# Patient Record
Sex: MALE | Race: ASIAN | NOT HISPANIC OR LATINO | URBAN - METROPOLITAN AREA
[De-identification: names, ages, dates, MRNs, and addresses within clinical notes are randomized per-mention and may not be internally consistent; named-entity substitution may affect disease eponyms.]

---

## 2021-08-14 ENCOUNTER — EMERGENCY (EMERGENCY)
Facility: HOSPITAL | Age: 31
LOS: 1 days | Discharge: HOME | End: 2021-08-14
Admitting: EMERGENCY MEDICINE
Payer: OTHER MISCELLANEOUS

## 2021-08-14 VITALS
SYSTOLIC BLOOD PRESSURE: 122 MMHG | OXYGEN SATURATION: 99 % | TEMPERATURE: 98 F | HEART RATE: 81 BPM | WEIGHT: 190.04 LBS | HEIGHT: 66 IN | RESPIRATION RATE: 16 BRPM | DIASTOLIC BLOOD PRESSURE: 71 MMHG

## 2021-08-14 VITALS
TEMPERATURE: 98 F | DIASTOLIC BLOOD PRESSURE: 75 MMHG | OXYGEN SATURATION: 99 % | HEART RATE: 51 BPM | SYSTOLIC BLOOD PRESSURE: 132 MMHG | RESPIRATION RATE: 18 BRPM
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
## 2021-08-14 DIAGNOSIS — S61.411A LACERATION WITHOUT FOREIGN BODY OF RIGHT HAND, INITIAL ENCOUNTER: ICD-10-CM

## 2021-08-14 DIAGNOSIS — S61.421A LACERATION WITH FOREIGN BODY OF RIGHT HAND, INITIAL ENCOUNTER: ICD-10-CM
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
## 2021-08-14 DIAGNOSIS — Z23 ENCOUNTER FOR IMMUNIZATION: ICD-10-CM

## 2021-08-14 PROCEDURE — 73130 X-RAY EXAM OF HAND: CPT | Mod: 26,RT

## 2021-08-14 PROCEDURE — 99283 EMERGENCY DEPT VISIT LOW MDM: CPT | Mod: 25

## 2021-08-14 PROCEDURE — 12001 RPR S/N/AX/GEN/TRNK 2.5CM/<: CPT

## 2021-08-14 RX ORDER — CEPHALEXIN 500 MG
1 CAPSULE ORAL
Qty: 14 | Refills: 0
Start: 2021-08-14 | End: 2021-08-20

## 2021-08-14 RX ORDER — CEPHALEXIN 500 MG
500 CAPSULE ORAL ONCE
Refills: 0 | Status: COMPLETED | OUTPATIENT
Start: 2021-08-14 | End: 2021-08-14

## 2021-08-14 RX ORDER — TETANUS TOXOID, REDUCED DIPHTHERIA TOXOID AND ACELLULAR PERTUSSIS VACCINE, ADSORBED 5; 2.5; 8; 8; 2.5 [IU]/.5ML; [IU]/.5ML; UG/.5ML; UG/.5ML; UG/.5ML
0.5 SUSPENSION INTRAMUSCULAR ONCE
Refills: 0 | Status: COMPLETED | OUTPATIENT
Start: 2021-08-14 | End: 2021-08-14

## 2021-08-14 RX ADMIN — TETANUS TOXOID, REDUCED DIPHTHERIA TOXOID AND ACELLULAR PERTUSSIS VACCINE, ADSORBED 0.5 MILLILITER(S): 5; 2.5; 8; 8; 2.5 SUSPENSION INTRAMUSCULAR at 18:50

## 2021-08-14 RX ADMIN — Medication 500 MILLIGRAM(S): at 19:52

## 2021-08-14 NOTE — ED PROVIDER NOTE - CARE PROVIDER_API CALL
urgent care/ED,   one week for suture removal  Phone: (   )    -  Fax: (   )    -  Follow Up Time:

## 2021-08-14 NOTE — ED PROVIDER NOTE - PROVIDER TOKENS
FREE:[LAST:[urgent care/ED],PHONE:[(   )    -],FAX:[(   )    -],ADDRESS:[one week for suture removal]]

## 2021-08-14 NOTE — ED PROVIDER NOTE - PROGRESS NOTE DETAILS
patient with 2x4mm fb on xray, unable to directly visualize on exam. wound irrigated copiously, laceration repaired, bacitracin applied, given keflex in ED. rx sent to pharmacy

## 2021-08-14 NOTE — ED ADULT NURSE NOTE - OBJECTIVE STATEMENT
Pt is a 31y male complaining of laceration to right palm that occurred while cleaning glasses. Unknown last tetanus.

## 2021-08-14 NOTE — ED PROVIDER NOTE - CLINICAL SUMMARY MEDICAL DECISION MAKING FREE TEXT BOX
Patient R hand dominant with laceration to the R palm that occurred while at work cleaning a wine glass. will do xray to assess for foreign body, up date tetanus, and do wound repair

## 2021-08-14 NOTE — ED PROVIDER NOTE - OBJECTIVE STATEMENT
Patient presents with R hand laceration that occurred at 3p while at work. states that he was cleaning a glass when it broke. denies focal weakness or parethesias. does not recall last tetanus Patient presents with R hand laceration that occurred at 3p while at work. states that he was cleaning a glass when it broke. denies focal weakness or parethesias. does not recall last tetanus. R hand dominant

## 2021-08-14 NOTE — ED PROVIDER NOTE - PATIENT PORTAL LINK FT
You can access the FollowMyHealth Patient Portal offered by [facility] by registering at the following website: [email or web address]. By joining [employer]’s FollowMyHealth portal, you will also be able to view your health information using other applications (apps) compatible with our system.

## 2021-08-14 NOTE — ED PROVIDER NOTE - NSFOLLOWUPINSTRUCTIONS_ED_ALL_ED_FT
apply bacitracin to the wound twice daily  return to ED in one week for suture removal.   return immediately if fever, chills, worsening pain, discharge from wound site

## 2021-08-21 ENCOUNTER — EMERGENCY (EMERGENCY)
Facility: HOSPITAL | Age: 31
LOS: 1 days | Discharge: HOME | End: 2021-08-21
Attending: EMERGENCY MEDICINE | Admitting: EMERGENCY MEDICINE
Payer: OTHER MISCELLANEOUS

## 2021-08-21 VITALS
OXYGEN SATURATION: 96 % | WEIGHT: 179.9 LBS | SYSTOLIC BLOOD PRESSURE: 132 MMHG | RESPIRATION RATE: 18 BRPM | TEMPERATURE: 98 F | DIASTOLIC BLOOD PRESSURE: 77 MMHG | HEART RATE: 76 BPM | HEIGHT: 66 IN

## 2021-08-21 PROCEDURE — 73130 X-RAY EXAM OF HAND: CPT | Mod: 26,RT

## 2021-08-21 PROCEDURE — 99283 EMERGENCY DEPT VISIT LOW MDM: CPT

## 2021-08-21 NOTE — ED PROVIDER NOTE - CARE PLAN
Principal Discharge DX:	Visit for suture removal  Secondary Diagnosis:	Foreign body in subcutaneous tissue   1

## 2021-08-21 NOTE — ED PROVIDER NOTE - NS ED MD DISPO DISCHARGE
“You can access the FollowHealth Patient Portal, offered by [facility], by registering with the following website: [email or web address]” Home

## 2021-08-21 NOTE — ED PROVIDER NOTE - CARE PROVIDER_API CALL
Kev Amaral)  Plastic Surgery; Surgery  [address] [place] [id number]  Phone: [phone]  Fax: [phone]  Follow Up Time:

## 2021-08-21 NOTE — ED PROVIDER NOTE - PATIENT PORTAL LINK FT
You can access the FollowMyHealth Patient Portal offered by [facility] by registering at the following website: [email or web address]. By joining [employer]’s FollowMyHealth portal, you will also be able to view your health information using other applications (apps) compatible with our system.

## 2021-08-21 NOTE — ED PROVIDER NOTE - OBJECTIVE STATEMENT
here for s/r of palm R but still feels fb, repeat XR shows fb retained, d/w Dr Amaral hand surgery and will see patient in office, no cellulitis present

## 2021-08-22 PROBLEM — Z78.9 OTHER SPECIFIED HEALTH STATUS: Chronic | Status: ACTIVE | Noted: 2021-08-14

## 2021-08-25 DIAGNOSIS — Z48.02 ENCOUNTER FOR REMOVAL OF SUTURES: ICD-10-CM
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
## 2021-08-25 DIAGNOSIS — S40.851D: ICD-10-CM
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
## 2023-01-10 NOTE — ED ADULT NURSE NOTE - CAS DISCH TRANSFER METHOD
Ears: no ear pain and no hearing problems. Nose: no nasal congestion and no nasal drainage. Mouth/Throat: no dysphagia, no hoarseness and no throat pain. Neck: no lumps, no pain, no stiffness and no swollen glands.
Walking

## 2023-03-29 NOTE — ED ADULT TRIAGE NOTE - WEIGHT METHOD
Caller: patient    Doctor: Mykel    Reason for call: pt called asking if his [facility] insurance contacted our office about the MRI    Call back#: stated

## 2023-06-14 NOTE — ED ADULT NURSE NOTE - CAS ELECT INFOMATION PROVIDED
[facility] Call Center    Phone Message    May a detailed message be left on voicemail: yes     Reason for Call: Other: How much longer for IV antibiotics and when can they have the PIC Line removed?     Action Taken: Other: ID     Travel Screening: Not Applicable                                                                      
Pt has appt with outside ID group on 6/16   
DC instructions

## 2024-04-12 NOTE — ED ADULT TRIAGE NOTE - PAIN: PRESENCE, MLM
H&P reviewed. After examining the patient I find no changes in the patients condition since the H&P had been written.    Vitals:    04/12/24 0938   BP: 115/82   Pulse: 73   Resp: 18   Temp: 98 °F (36.7 °C)   SpO2: 100%     
complains of pain/discomfort

## 2024-11-25 NOTE — ED PROVIDER NOTE - NSICDXNOPASTMEDICALHX_GEN_ALL_ED
Currently written out of a department not supported by [employer]:      Will need to be changed to PCP or other covered clinic.    A request to electronically prior auth this medication has not been started.  To initiate this request: first change the encounter department to your department, second Verify RX Benefits, third go to Meds and Order, open medication, click the 'Request' PA link.    If you have questions on where to find Request PA, copy the web address below into your web browser to open the Electronic Prior Auth Tip Sheet and reference the \"Retrospective Prior Auth Requests\" section.     If you have any questions relating to the [employer] workflow, please reach out to your onsite Informatics team for assistance.    Web Address: [email or web address]      
[facility]  [address] [place] [address]    Medication: Eliquis   Dosage: 2.5 MG  Quantity requested:  60  Pharmacy for prescription has been selected.Please call plan at [phone]     Patient ID# [id number]    
<-- Click to add NO pertinent Past Medical History